# Patient Record
Sex: FEMALE | Race: WHITE | URBAN - METROPOLITAN AREA
[De-identification: names, ages, dates, MRNs, and addresses within clinical notes are randomized per-mention and may not be internally consistent; named-entity substitution may affect disease eponyms.]

---

## 2020-01-01 ENCOUNTER — DOCUMENTATION ONLY (OUTPATIENT)
Dept: GASTROENTEROLOGY | Facility: CLINIC | Age: 30
End: 2020-01-01

## 2020-01-01 ENCOUNTER — TELEPHONE (OUTPATIENT)
Dept: GASTROENTEROLOGY | Facility: CLINIC | Age: 30
End: 2020-01-01

## 2020-01-01 ENCOUNTER — OFFICE VISIT (OUTPATIENT)
Dept: GASTROENTEROLOGY | Facility: CLINIC | Age: 30
End: 2020-01-01
Attending: INTERNAL MEDICINE
Payer: COMMERCIAL

## 2020-01-01 VITALS
SYSTOLIC BLOOD PRESSURE: 111 MMHG | RESPIRATION RATE: 22 BRPM | BODY MASS INDEX: 35.07 KG/M2 | OXYGEN SATURATION: 94 % | DIASTOLIC BLOOD PRESSURE: 69 MMHG | HEART RATE: 102 BPM | TEMPERATURE: 99.2 F | HEIGHT: 64 IN | WEIGHT: 205.4 LBS

## 2020-01-01 DIAGNOSIS — K70.11 ALCOHOLIC HEPATITIS WITH ASCITES (H): Primary | ICD-10-CM

## 2020-01-01 DIAGNOSIS — Z79.899 HIGH RISK MEDICATION USE: ICD-10-CM

## 2020-01-01 DIAGNOSIS — E87.70 HYPERVOLEMIA, UNSPECIFIED HYPERVOLEMIA TYPE: ICD-10-CM

## 2020-01-01 DIAGNOSIS — F10.21 ALCOHOL USE DISORDER, SEVERE, IN EARLY REMISSION (H): ICD-10-CM

## 2020-01-01 DIAGNOSIS — K70.31 ALCOHOLIC CIRRHOSIS OF LIVER WITH ASCITES (H): ICD-10-CM

## 2020-01-01 DIAGNOSIS — K70.11 ALCOHOLIC HEPATITIS WITH ASCITES (H): ICD-10-CM

## 2020-01-01 LAB
ALBUMIN SERPL-MCNC: 1.6 G/DL (ref 3.4–5)
ALP SERPL-CCNC: 222 U/L (ref 40–150)
ALT SERPL W P-5'-P-CCNC: 55 U/L (ref 0–50)
ANION GAP SERPL CALCULATED.3IONS-SCNC: 9 MMOL/L (ref 3–14)
AST SERPL W P-5'-P-CCNC: 194 U/L (ref 0–45)
BILIRUB DIRECT SERPL-MCNC: 18 MG/DL (ref 0–0.2)
BILIRUB SERPL-MCNC: 20 MG/DL (ref 0.2–1.3)
BUN SERPL-MCNC: 13 MG/DL (ref 7–30)
CALCIUM SERPL-MCNC: 7.9 MG/DL (ref 8.5–10.1)
CHLORIDE SERPL-SCNC: 99 MMOL/L (ref 94–109)
CO2 SERPL-SCNC: 22 MMOL/L (ref 20–32)
CREAT SERPL-MCNC: 0.6 MG/DL (ref 0.52–1.04)
ERYTHROCYTE [DISTWIDTH] IN BLOOD BY AUTOMATED COUNT: 23.2 % (ref 10–15)
GFR SERPL CREATININE-BSD FRML MDRD: >90 ML/MIN/{1.73_M2}
GLUCOSE SERPL-MCNC: 105 MG/DL (ref 70–99)
HCT VFR BLD AUTO: 30.2 % (ref 35–47)
HGB BLD-MCNC: 9.9 G/DL (ref 11.7–15.7)
INR PPP: 1.72 (ref 0.86–1.14)
MCH RBC QN AUTO: 35.4 PG (ref 26.5–33)
MCHC RBC AUTO-ENTMCNC: 32.8 G/DL (ref 31.5–36.5)
MCV RBC AUTO: 108 FL (ref 78–100)
PLATELET # BLD AUTO: 326 10E9/L (ref 150–450)
POTASSIUM SERPL-SCNC: 3.8 MMOL/L (ref 3.4–5.3)
PROT SERPL-MCNC: 5.6 G/DL (ref 6.8–8.8)
RBC # BLD AUTO: 2.8 10E12/L (ref 3.8–5.2)
SODIUM SERPL-SCNC: 130 MMOL/L (ref 133–144)
WBC # BLD AUTO: 38.3 10E9/L (ref 4–11)

## 2020-01-01 PROCEDURE — 80076 HEPATIC FUNCTION PANEL: CPT | Performed by: INTERNAL MEDICINE

## 2020-01-01 PROCEDURE — 85610 PROTHROMBIN TIME: CPT | Performed by: INTERNAL MEDICINE

## 2020-01-01 PROCEDURE — 80048 BASIC METABOLIC PNL TOTAL CA: CPT | Performed by: INTERNAL MEDICINE

## 2020-01-01 PROCEDURE — G0463 HOSPITAL OUTPT CLINIC VISIT: HCPCS | Mod: ZF

## 2020-01-01 PROCEDURE — 85027 COMPLETE CBC AUTOMATED: CPT | Performed by: INTERNAL MEDICINE

## 2020-01-01 PROCEDURE — 36415 COLL VENOUS BLD VENIPUNCTURE: CPT | Performed by: INTERNAL MEDICINE

## 2020-01-01 RX ORDER — LANOLIN ALCOHOL/MO/W.PET/CERES
250 CREAM (GRAM) TOPICAL DAILY
COMMUNITY

## 2020-01-01 RX ORDER — GABAPENTIN 100 MG/1
200 CAPSULE ORAL 3 TIMES DAILY
COMMUNITY

## 2020-01-01 RX ORDER — FUROSEMIDE 20 MG
20 TABLET ORAL DAILY
Qty: 30 TABLET | Refills: 3 | Status: SHIPPED | OUTPATIENT
Start: 2020-01-01

## 2020-01-01 RX ORDER — SPIRONOLACTONE 50 MG/1
50 TABLET, FILM COATED ORAL DAILY
Qty: 30 TABLET | Refills: 3 | Status: SHIPPED | OUTPATIENT
Start: 2020-01-01

## 2020-01-01 RX ORDER — HYDROXYZINE HYDROCHLORIDE 25 MG/1
25 TABLET, FILM COATED ORAL 3 TIMES DAILY PRN
COMMUNITY

## 2020-01-01 RX ORDER — PREDNISOLONE SODIUM PHOSPHATE 15 MG/5ML
13.35 SOLUTION ORAL DAILY
COMMUNITY

## 2020-01-01 RX ORDER — FOLIC ACID 1 MG/1
1 TABLET ORAL DAILY
COMMUNITY
Start: 2016-01-30

## 2020-01-01 RX ORDER — PROPRANOLOL HYDROCHLORIDE 20 MG/1
20 TABLET ORAL EVERY 8 HOURS PRN
COMMUNITY

## 2020-01-01 RX ORDER — OXYCODONE HYDROCHLORIDE 10 MG/1
5 TABLET ORAL EVERY 4 HOURS PRN
COMMUNITY

## 2020-01-01 ASSESSMENT — MIFFLIN-ST. JEOR: SCORE: 1631.69

## 2020-01-01 ASSESSMENT — PAIN SCALES - GENERAL: PAINLEVEL: EXTREME PAIN (9)

## 2020-01-13 NOTE — NURSING NOTE
"Chief Complaint   Patient presents with     Consult     alcohol hepatitis        Vital signs:  Temp: 99.2  F (37.3  C) Temp src: Oral BP: 111/69 Pulse: 102   Resp: 22 SpO2: 94 %     Height: 162.6 cm (5' 4\") Weight: 93.2 kg (205 lb 6.4 oz)  Estimated body mass index is 35.26 kg/m  as calculated from the following:    Height as of this encounter: 1.626 m (5' 4\").    Weight as of this encounter: 93.2 kg (205 lb 6.4 oz).          Shelia Laws, [facility]  1/13/2020 10:16 AM      "
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
## 2020-01-13 NOTE — PATIENT INSTRUCTIONS
- I want you to stop the prednisone/prednisolone today    - Labs on the first floor today    - I will prescribe you 2 diuretics to help get excess fluid off your body    - If symptoms get worse -seek health care    - Worsening of abdominal distension    - Worsening of your shortness of breath    - Acute worsening of your pain syndrome    - Acute onset of confusion    Cirrhosis Education  Nutrition  - For patients with cirrhosis, it is very important to eat the right types and amounts of foods.  We recommend a diet low in carbohydrates/sugars and high in fresh fruits/vegetables, with the right amount of protein.  We typically recommend 1.5gm/kg/day of protein, or  grams of protein every day.  - In regard to protein intake, you can focus on: All meats, cooked fish and seafood, vegetable-based protein meat products, tofu, eggs, legumes, dairy products (including milk and yogurt and cheese), unsalted nuts.  - You should eat at least three meals a day and three to four snacks between meals  - Bedtime snacks are especially important (preferrably something with some protein)    - Patients with malnutrition and/or loss of muscular mass can improve their nutrition and muscular mass by drinking two cans of dietary supplements daily, particularly at bedtime.  These would include: Ensure, Boost, [place] Instant Milk, Glucerna, (or similar supplements)  - Please avoid eating raw seafood, especially shellfish, because of risk of serious illness  - We recommend all patients with cirrhosis limit their daily sodium intake to less than 2,000mg      General Liver Health  - Continue to avoid the use of all non-steroid anti-inflammatory medicines (ibuprofen, Aleve, naproxen, aspirin, etc.) as this can cause serious injury to your kidneys in the setting of liver disease    Sleep Troubles:  - Sleep issues are very common in patients with chronic liver disease  - Recommend strict avoidance of medications such as narcotics,  sedatives and sleep aids.    - Low dose benadryl or melatonin can be used for insomnia, if needed.

## 2020-01-14 NOTE — TELEPHONE ENCOUNTER
[place] from [facility] with fax # [phone].  Letter faxed.    Shirley Espinoza LPN  Hepatology Clinic    ------  [facility] with [facility].  To call writer back with fax number.    Shirley Espinoza LPN  Hepatology Clinic    -------  Leventhal, Thomas Michael, MD Beckenbach, Shannon, LPN      Note is in the chart     TL     ---------  Beckenbach, Shannon,LPN Leventhal, Thomas Michael, MD     Per patient she wants to leave the [facility] and go on pallative care, but needs a letter from you stating that is the plan otherwise it will be considered AMA. Let me know if I can help with anything?       ---------  [facility]    Phone Message    May a detailed message be left on voicemail: yes    Reason for Call: Other:       [facility]      She needs a Dr's clearance to sign out so it is not AMA.     Please call Nazia back today to discuss.     She will be going home to live with her family.         Action Taken: Message routed to:  [facility] Hepatology

## 2020-01-14 NOTE — TELEPHONE ENCOUNTER
Call back to patient's mom, Danielle, regarding call center message. Danielle reports patient is currently admitted at [facility] and hospice is already scheduled to meet with patient today. Nothing needed at this time.     Shirley Espinoza LPN  Hepatology Clinic    -------  [facility] Call Center    Phone Message    May a detailed message be left on voicemail: yes    Reason for Call: Other: Pt mom requests call back to discuss hospice palliative care - please return her call - thanks     Action Taken: Message routed to:  [facility] Hepatology

## 2020-01-14 NOTE — PROGRESS NOTES
"Nazia Clyde  1990      HEPATOLOGY CARE PLAN DISCUSSION    The patient is a 29-year-old female with longstanding history of anxiety, depression, alcohol use disorder which is severe and active with resultant chronic liver disease and a diagnosis of acute alcoholic hepatitis who presented in consultation to our clinic on January 13, 2020.  During the clinical encounter she was noted to have the following labs    MELD-Na score: 28 at 1/13/2020 11:55 AM  MELD score: 24 at 1/13/2020 11:55 AM  Calculated from:  Serum Creatinine: 0.60 mg/dL (Rounded to 1 mg/dL) at 1/13/2020 11:55 AM  Serum Sodium: 130 mmol/L at 1/13/2020 11:55 AM  Total Bilirubin: 20.0 mg/dL at 1/13/2020 11:55 AM  INR(ratio): 1.72 at 1/13/2020 11:55 AM  Age: 29 years    Based on this information, commonly used prognostic indices suggest the following:   Penfield Score for Alcoholic Hepatitis: score=9 - 46% 28d survival, 40% 84 day survival    I discussed my concerns that the patient had both alcoholic hepatitis as well as likely underlying alcoholic cirrhosis given her longstanding history of alcohol use disorder.  We discussed that having alcoholic hepatitis in the setting of underlying chronic liver disease can worsen overall prognosis.  My concern is that she had been on steroids for at least 14 days without significant improvement in her overall serum bilirubin and felt based on prognostic scores that it was appropriate to discontinue corticosteroid use at this time.    Based on approximately 50% survival rate over the next several months, I did discuss with the patient her treatment options.  We discussed a more focused and \"curative\" treatment plan: This included remaining in healthcare facility as needed to help maintain sobriety and aggressive medication titration to treat the complications of her liver disease.  Noted that our ability to aggressively treat her abdominal pain and discomfort may be limited as these medications can often " exacerbate changes of chronic liver disease including encephalopathy.  Discussed that even after completion of a prolonged period of sobriety the patient would need to undergo aggressive chemical dependency evaluation and treatment and demonstrated prolonged period of sobriety outside of facility living before she could be considered a candidate to be evaluated for liver transplantation.  Also made clear to the patient and her mother that she is not currently a candidate for transplantation for alcoholic hepatitis as she has had a longstanding encounters with the healthcare system regarding her alcohol misuse.    We discussed another option of focusing on symptom management and attempting to maximize her current quality of life.  We discussed that this is a very personal decision, but it was very obvious during our clinic visit that her abdominal pain discomfort is leading to a very poor quality of life at this time.  Discussed that palliation of symptoms involves aggressive symptom management, and this may at times have unintended consequences of progression of the disease or worsening of other symptoms of her underlying liver disease.    I did discuss that I feel either of these options are appropriate, and that I would fully support her in either role.  We noted that she had become a very long ways in order to be evaluated at our facility, as did her mother who is her primary support person.  Discussed that she could obtain hepatology/GI care in [place], and could likely be seen in palliative care with potential enrollment in hospice through their facility as well, if this were the treatment course the patient and her family would choose to proceed with.    We did set follow-up with the patient in a few weeks should she continue to follow with an aggressive course.    Thomas M. Leventhal, M.D.   of Medicine  Advanced & Transplant Hepatology  [facility]

## 2020-01-14 NOTE — TELEPHONE ENCOUNTER
The pt called back to make sure Dr Leventhal used the correct phone # when he calls her. [phone]

## 2020-02-11 DIAGNOSIS — K70.11 ALCOHOLIC HEPATITIS WITH ASCITES (H): Primary | ICD-10-CM
